# Patient Record
Sex: MALE | Race: WHITE
[De-identification: names, ages, dates, MRNs, and addresses within clinical notes are randomized per-mention and may not be internally consistent; named-entity substitution may affect disease eponyms.]

---

## 2020-12-16 ENCOUNTER — HOSPITAL ENCOUNTER (OUTPATIENT)
Dept: HOSPITAL 47 - RADMRIMAIN | Age: 30
Discharge: HOME | End: 2020-12-16
Attending: ORTHOPAEDIC SURGERY
Payer: COMMERCIAL

## 2020-12-16 DIAGNOSIS — M67.814: ICD-10-CM

## 2020-12-16 DIAGNOSIS — M24.212: ICD-10-CM

## 2020-12-16 DIAGNOSIS — S42.92XA: Primary | ICD-10-CM

## 2020-12-16 DIAGNOSIS — S43.402A: ICD-10-CM

## 2020-12-16 NOTE — MR
EXAMINATION TYPE: MR shoulder LT wo con

 

DATE OF EXAM: 12/16/2020

 

COMPARISON: X-ray from outside institution 12/11/2020

 

HISTORY: Pain in the left shoulder

 

TECHNIQUE: Multiplanar, multisequence imaging of the left shoulder is performed without contrast.

 

FINDINGS: There is diffuse edema involving the humerus with findings compatible with a fracture of th
e greater tuberosity with mild displacement. Bicipital tendon remains well situated in the bicipital 
groove. Hypertrophic arthropathy of the AC joint with mild impingement of the supraspinatus muscle.

 

Visualized rotator cuff demonstrates intrasubstance signal at the insertion of the posterior fibers o
f the supraspinatus and anterior fibers of the infraspinatus compatible with tendinosis. No definite 
through thickness tear. Bursal scuffing along the insertion of the supraspinatus tendon noted.

 

Superior labrum has a normal appearance. There is truncation of the inferior labrum and mild thickeni
ng of the inferior glenohumeral ligament. No evidence of tear.

 

IMPRESSION:

1. Mildly displaced greater tuberosity fracture with extensive marrow edema.

2. There is thickening of the inferior glenohumeral ligament and truncation of the inferior labrum. L
igamentous sprain without evidence of definite tear and labral injury are suspected.

3. Tendinosis at the insertion of the infraspinatus and supraspinatus tendons with no evidence of thr
ough thickness tear or retraction. Bursal scuffing along the posterior fibers of the insertion of the
 supraspinatus tendon noted.